# Patient Record
Sex: FEMALE
[De-identification: names, ages, dates, MRNs, and addresses within clinical notes are randomized per-mention and may not be internally consistent; named-entity substitution may affect disease eponyms.]

---

## 2017-08-17 ENCOUNTER — HOSPITAL ENCOUNTER (EMERGENCY)
Dept: HOSPITAL 31 - C.ER | Age: 37
Discharge: HOME | End: 2017-08-17
Payer: COMMERCIAL

## 2017-08-17 VITALS
RESPIRATION RATE: 18 BRPM | HEART RATE: 71 BPM | TEMPERATURE: 97.9 F | SYSTOLIC BLOOD PRESSURE: 116 MMHG | OXYGEN SATURATION: 97 % | DIASTOLIC BLOOD PRESSURE: 71 MMHG

## 2017-08-17 VITALS — BODY MASS INDEX: 35.7 KG/M2

## 2017-08-17 DIAGNOSIS — J30.9: Primary | ICD-10-CM

## 2017-08-17 NOTE — C.PDOC
History Of Present Illness


38 y/o female presents to ED with complaints of cough for 2 weeks. Patient 

states cough is persistent and develops gagging feeling following shortness of 

breath. Patient also reports watery eyes and scratchy throat with associated 

chest heaviness. Patient denies fever, chills, nausea, vomiting, headache or 

any other complaints at this time. 


Time Seen by Provider: 08/17/17 11:07


Chief Complaint (Nursing): Shortness Of Breath


History Per: Patient


History/Exam Limitations: no limitations


Onset/Duration Of Symptoms: Days


Current Symptoms Are (Timing): Still Present





Past Medical History


Reviewed: Historical Data, Nursing Documentation, Vital Signs


Vital Signs: 


 Last Vital Signs











Temp  97.3 F L  08/17/17 10:59


 


Pulse  68   08/17/17 10:59


 


Resp  20   08/17/17 10:59


 


BP  112/74   08/17/17 10:59


 


Pulse Ox  96   08/17/17 12:00














- [facility] Procedures








APPLICATION OF SPLINT (09/09/02)








Family History: States: No Known Family Hx





- Social History


Hx Alcohol Use: No


Hx Substance Use: No





Review Of Systems


Except As Marked, All Systems Reviewed And Found Negative.


Constitutional: Negative for: Fever, Chills


Cardiovascular: Negative for: Chest Pain


Respiratory: Positive for: Cough, Shortness of Breath


Gastrointestinal: Negative for: Nausea, Vomiting


Skin: Negative for: Rash


Neurological: Negative for: Weakness, Headache





Physical Exam





- Physical Exam


Appears: Non-toxic, No Acute Distress


Skin: Normal Color, Warm, Dry, No Rash


Head: Atraumatic, Normacephalic


Eye(s): bilateral: EOMI, Other (Mild conjuctival injection)


Nose: Normal


Oral Mucosa: Moist


Throat: Normal, No Erythema


Chest: Symmetrical


Cardiovascular: Rhythm Regular


Respiratory: Normal Breath Sounds, No Rales, No Rhonchi, No Wheezing


Gastrointestinal/Abdominal: Soft, No Tenderness, No Guarding, No Rebound


Neurological/Psych: Oriented x3, Normal Speech





ED Course And Treatment


O2 Sat by Pulse Oximetry: 96 (RA)


Pulse Ox Interpretation: Normal





Medical Decision Making


Medical Decision Making: 


Differential : Season allergy vs URI


Plan: Prednisone, Claritin 





Patient with minimal improvement. 


Plan to d/c with steroids and PMD follow up. 








Disposition


Counseled Patient/Family Regarding: Diagnosis, Need For Followup, Rx Given





- Disposition


Referrals: 


[facility] [Outside]


Disposition: HOME/ ROUTINE


Disposition Time: 12:02


Condition: STABLE


Additional Instructions: 


Follow up with your doctor or the clinic. 


Return to the Emergency Department with any other concerns. 


Prescriptions: 


Loratadine/Pseudoephedrine [Claritin-D 24 Hour Tablet] 1 tab PO DAILY #10 

tab.er.24h


Prednisone [Deltasone] 60 mg PO DAILY #12 tablet


Instructions:  Allergic Rhinitis (ED)


Forms:  [facility] (English), General Discharge Instructions, Work Excuse





- POA


Present On Arrival: None





- Clinical Impression


Clinical Impression: 


 Allergic rhinitis








- Scribe Statement


The provider has reviewed the documentation as recorded by the Joibsushila Luu





All medical record entries made by the Franklin were at my direction and 

personally dictated by me. I have reviewed the chart and agree that the record 

accurately reflects my personal performance of the history, physical exam, 

medical decision making, and the department course for this patient. I have 

also personally directed, reviewed, and agree with the discharge instructions 

and disposition.

## 2018-02-07 ENCOUNTER — HOSPITAL ENCOUNTER (EMERGENCY)
Dept: HOSPITAL 31 - C.ER | Age: 38
Discharge: HOME | End: 2018-02-07
Payer: COMMERCIAL

## 2018-02-07 VITALS — HEART RATE: 78 BPM | SYSTOLIC BLOOD PRESSURE: 111 MMHG | DIASTOLIC BLOOD PRESSURE: 68 MMHG

## 2018-02-07 VITALS — BODY MASS INDEX: 35.7 KG/M2

## 2018-02-07 VITALS — RESPIRATION RATE: 16 BRPM | OXYGEN SATURATION: 98 % | TEMPERATURE: 98.3 F

## 2018-02-07 DIAGNOSIS — J06.9: Primary | ICD-10-CM

## 2018-02-07 NOTE — C.PDOC
Time Seen by Provider: 02/07/18 13:18


Chief Complaint (Nursing): Cough, Cold, Congestion


History Per: Patient


Onset/Duration Of Symptoms: Days (1)


Current Symptoms Are (Timing): Still Present


Associated Symptoms: Fever, Sore Throat, Cough, Myalgias, Nasal Congestion


Severity: Moderate


Additional History Per: Prior Records





Past Medical History


Reviewed: Historical Data, Nursing Documentation, Vital Signs


Vital Signs: 





 Last Vital Signs











Temp  98.3 F   02/07/18 12:20


 


Pulse  89   02/07/18 12:20


 


Resp  16   02/07/18 12:20


 


BP  135/90   02/07/18 12:20


 


Pulse Ox  98   02/07/18 12:20














- Medical History


PMH: No Chronic Diseases





- [facility] Procedures











APPLICATION OF SPLINT (09/09/02)








Family History: States: Unknown Family Hx





- Social History


Hx Alcohol Use: No


Hx Substance Use: No





Review Of Systems


Except As Marked, All Systems Reviewed And Found Negative.


Constitutional: Negative for: Weakness


ENT: Positive for: Nose Congestion, Throat Pain.  Negative for: Ear Pain


Cardiovascular: Negative for: Chest Pain


Respiratory: Positive for: Cough.  Negative for: Shortness of Breath, Hemoptysis


Gastrointestinal: Negative for: Vomiting, Abdominal Pain, Diarrhea


Genitourinary: Negative for: Dysuria


Musculoskeletal: Negative for: Neck Pain


Skin: Negative for: Rash


Neurological: Positive for: Headache.  Negative for: Weakness, Numbness, 

Seizures, Altered Mental Status





Physical Exam





- Physical Exam


Appears: Non-toxic, No Acute Distress


Skin: Normal Color, Warm, Dry, No Rash


Head: Atraumatic, Normacephalic


Eye(s): bilateral: Normal Inspection, PERRL, EOMI


Oral Mucosa: Moist, No Drooling, No Trismus


Throat: Erythema, No Exudate, No Drooling, No Mass


Neck: Normal ROM, Supple


Lymphatic: No Adenopathy


Cardiovascular: Rhythm Regular


Respiratory: Normal Breath Sounds, No Accessory Muscle Use


Gastrointestinal/Abdominal: Soft, No Tenderness


Extremity: Normal ROM


Neurological/Psych: Oriented x3, Normal Speech, Normal Motor, Normal Sensation





ED Course And Treatment


O2 Sat by Pulse Oximetry: 98


Pulse Ox Interpretation: Normal





Disposition


Counseled Patient/Family Regarding: Studies Performed, Diagnosis, Need For 

Followup, Rx Given





- Disposition


Referrals: 


Jamin Mike MD [Medical Doctor] - 


Disposition: HOME/ ROUTINE


Disposition Time: 14:07


Condition: STABLE


Additional Instructions: 


Drink plenty of fluids. Follow up with your doctor. Return to the ER if you 

develop shortness of breath, worsening of symptoms or if you have any other 

concerns. 


Prescriptions: 


Guaifenesin/Dextromethorphan [Mucinex Dm ER 1,200-60 mg Tab] 1 tab PO BID PRN #

14 tab.er.12h


 PRN Reason: Cough And Congestion


Naproxen [Naprosyn] 1 tab PO BID PRN #20 tab


 PRN Reason: Pain


Oxymetazoline 0.05% [Oxymetazoline HCl 30 Ml] 2 sprays NS BID #1 bottle


Instructions:  Cold Symptoms (ED)


Forms:  [facility] (English)





- Clinical Impression


Clinical Impression: 


 Upper respiratory infection

## 2019-03-19 ENCOUNTER — HOSPITAL ENCOUNTER (EMERGENCY)
Dept: HOSPITAL 42 - ED | Age: 39
Discharge: HOME | End: 2019-03-19
Payer: COMMERCIAL

## 2019-03-19 VITALS
OXYGEN SATURATION: 98 % | DIASTOLIC BLOOD PRESSURE: 80 MMHG | RESPIRATION RATE: 18 BRPM | HEART RATE: 98 BPM | TEMPERATURE: 98.6 F | SYSTOLIC BLOOD PRESSURE: 126 MMHG

## 2019-03-19 VITALS — BODY MASS INDEX: 35.7 KG/M2

## 2019-03-19 DIAGNOSIS — M79.604: ICD-10-CM

## 2019-03-19 DIAGNOSIS — M25.561: Primary | ICD-10-CM

## 2019-03-19 PROCEDURE — 81025 URINE PREGNANCY TEST: CPT

## 2019-03-19 PROCEDURE — 96372 THER/PROPH/DIAG INJ SC/IM: CPT

## 2019-03-19 PROCEDURE — 93971 EXTREMITY STUDY: CPT

## 2019-03-19 PROCEDURE — 73590 X-RAY EXAM OF LOWER LEG: CPT

## 2019-03-19 PROCEDURE — 99284 EMERGENCY DEPT VISIT MOD MDM: CPT

## 2019-03-19 PROCEDURE — 73562 X-RAY EXAM OF KNEE 3: CPT

## 2019-03-19 NOTE — ED PDOC
Arrival/HPI





- General


Chief Complaint: Lower Extremity Problem/Injury


Time Seen by Provider: 19 18:08


Historian: Patient





- History of Present Illness


Narrative History of Present Illness (Text): 





19 21:51


39-year-old female presents today with right knee pain and right anterior 

tib/fib pain.  Patient states 3 days ago she tripped and fell injuring the right

knee.  Patient states initially she was not having any pain and then about an 

hour after the fall she developed severe pain in the anterior aspect of the knee

radiating into the tib-fib.  Patient denies numbness weakness or tingling in the

extremity.  Patient states she took Motrin at home for pain with minimal 

improvement.  Patient states pain is worse with range of motion of the knee.  No

other complaints





Past Medical History





- Provider Review


Nursing Documentation Reviewed: Yes





- Travel History


Have you recently traveled outside [place] w/in the past 3 mons?: No





- Infectious Disease


Hx of Infectious Diseases: None





- Tetanus Immunization


Tetanus Immunization: Unknown





- Reproductive


Menopause: No





- Psychiatric


Hx Substance Use: No





- Surgical History


Hx  Section: Yes





- Anesthesia


Hx Anesthesia: No


Hx Anesthesia Reactions: No





Family/Social History





- Physician Review


Nursing Documentation Reviewed: Yes


Family/Social History: Unknown Family HX


Smoking Status: Never Smoked


Hx Alcohol Use: No


Hx Substance Use: No





Allergies/Home Meds


Allergies/Adverse Reactions: 


Allergies





No Known Allergies Allergy (Verified 18 12:22)


   











Review of Systems





- Review of Systems


Constitutional: absent: Fatigue, Fevers


Respiratory: absent: SOB, Cough


Cardiovascular: absent: Chest Pain, Palpitations


Gastrointestinal: absent: Abdominal Pain, Constipation, Diarrhea, Nausea, 

Vomiting


Musculoskeletal: Arthralgias.  absent: Back Pain, Neck Pain


Skin: absent: Rash, Pruritis


Neurological: absent: Headache, Dizziness


Psychiatric: absent: Anxiety, Depression





Physical Exam


Vital Signs Reviewed: Yes





Vital Signs











  Temp Pulse Resp BP Pulse Ox


 


 19 18:18  98.6 F  98 H  18  126/80  98











Temperature: Afebrile


Blood Pressure: Normal


Pulse: Regular


Respiratory Rate: Normal


Appearance: Positive for: Well-Appearing, Non-Toxic, Comfortable


Pain Distress: None


Mental Status: Positive for: Alert and Oriented X 3





- Systems Exam


Head: Present: Atraumatic


Mouth: Present: Moist Mucous Membranes


Neck: Present: Normal Range of Motion


Respiratory/Chest: Present: Clear to Auscultation


Cardiovascular: Present: Regular Rate and Rhythm


Lower Extremity: Present: Tenderness (right knee; + ttp over the anterior aspect

of the knee and in the inferior aspect of knee. + ttp over proximal tib/fib; + 

ecchymosis; limited flexion/extension of knee with pain. no calf tenderness. ), 

Swelling, Neurovascularly Intact, Capillary Refill < 2 s.  No: CALF TENDERNESS, 

Normal ROM, Erythema


Neurological: Present: GCS=15, Speech Normal, Motor Func Grossly Intact, Normal 

Sensory Function


Skin: Present: Warm, Dry


Psychiatric: Present: Alert, Oriented x 3





Medical Decision Making


ED Course and Treatment: 





19 21:25


Patient nontoxic well-appearing in no distress with stable vital signs





X-rays of the knee: No fracture





Toradol IM








Patient placed in knee immobilizer Crutches given for ambulation





Duplex of the lower extremity reveals no DVT verbal report from ultrasound tech








I discussed all results with patient advised to followup with the orthopedist 

for the next 2 days. Return if symptoms worsen persist or new symptoms develop 

patient states she wants to use Dr. Sheppard is her orthopedist








Patient verbalizes understanding of discharge instructions and need for 

immediate followup.











All aspects of this case were discussed the attending of record.








Impression: knee pain


Motrin every 6 hours as needed for pain


tramadol; 1 tablet every 8 hours as needed for moderate to severe pain; may 

cause drowsiness.


Rest, ice, compression, elevation


Use crutches for ambulation


Followup with the orthopedist within the next 2 days 


Followup with primary care physician within the next 2 days


Return if symptoms worsen persist or if new symptoms develop








- RAD Interpretation


Radiology Orders: 











19 19:29


KNEE W PATELLA RIGHT 3 VIEW [RAD] Stat 


TIBIA FIBULA RIGHT [RAD] Stat 


DUPLEX LOWER EXTRM VEIN RIGHT [US] Stat 














- Medication Orders


Current Medication Orders: 














Discontinued Medications





Ketorolac Tromethamine (Toradol)  60 mg IM STAT STA


   Stop: 19 19:30











Disposition/Present on Arrival





- Present on Arrival


Any Indicators Present on Arrival: No


History of DVT/PE: No


History of Uncontrolled Diabetes: No


Urinary Catheter: No


History of Decub. Ulcer: No


History Surgical Site Infection Following: None





- Disposition


Have Diagnosis and Disposition been Completed?: Yes


Diagnosis: 


 Knee pain, Leg pain





Disposition: HOME/ ROUTINE


Disposition Time: 20:50


Patient Plan: Discharge


Patient Problems: 


                             Current Active Problems











Problem Status Onset


 


Knee pain Acute 


 


Leg pain Acute 











Condition: GOOD


Discharge Instructions (ExitCare):  Muscle and Bone Pain (DC), Knee Pain (DC)


Additional Instructions: 


Motrin every 6 hours as needed for pain


tramadol; 1 tablet every 8 hours as needed for moderate to severe pain; may 

cause drowsiness.


Rest, ice, compression, elevation


Use crutches for ambulation


Followup with the orthopedist within the next 2 days 


Followup with primary care physician within the next 2 days


Return if symptoms worsen persist or if new symptoms develop


Prescriptions: 


Ibuprofen [Motrin] 600 mg PO Q6H PRN #20 tab


 PRN Reason: pain/fever reduction


traMADol [Ultram] 50 mg PO Q6H PRN #6 tab


 PRN Reason: moderate to severe pain


Referrals: 


Lionel Miller APN [Primary Care Provider] - Follow up with primary


Garfield Pierce MD [Staff Provider] - Follow up with primary


Forms:  [facility] (English), WORK NOTE

## 2019-03-20 NOTE — RAD
Date of service: 



03/19/2019



PROCEDURE:  Radiographs of the right tibia and fibula.



HISTORY:

pain/swelling s/p injury



COMPARISON:

None available



TECHNIQUE:

Frontal and lateral views obtained.



FINDINGS:



BONES:

No fracture or destructive lesion.



JOINT SPACES:

Unremarkable.



OTHER FINDINGS:

None.



IMPRESSION:

Unremarkable radiographs of the right tibia and fibula.

## 2019-03-20 NOTE — RAD
Date of service: 



03/19/2019



PROCEDURE:  Right Knee Radiographs.



HISTORY:

knee pain



COMPARISON:

None.



FINDINGS:



BONES:

Normal. No fracture. 



JOINTS:

Normal. No osteoarthritis. 



JOINT EFFUSION:

None. 



OTHER FINDINGS:

None.



IMPRESSION:

Normal radiographs of the right knee.

## 2019-03-20 NOTE — US
PROCEDURE:  Right lower extremity venous US 



HISTORY:

Leg pain and swelling. Evaluate for DVT.



PHYSICIAN(S):  Kane Braswell M.D.



TECHNIQUE:

Duplex sonography and color-flow Doppler with graded compression were 

used to evaluate the deep venous system of the right lower extremity. 



FINDINGS:

The exam is limited by body habitus and edema. 



The visualized deep venous system of the right lower extremity is 

sonographically normal and compressible. Normal waveforms and 

augmentation are seen. There is no sonographic evidence for deep 

venous thrombosis in the visualized segments of the right lower 

extremity.



IMPRESSION:

1. No sonographic evidence for deep venous thrombosis in the 

visualized segments of the right lower extremity.

## 2019-03-26 ENCOUNTER — HOSPITAL ENCOUNTER (OUTPATIENT)
Dept: HOSPITAL 42 - RAD | Age: 39
End: 2019-03-26
Payer: COMMERCIAL